# Patient Record
Sex: FEMALE | Race: WHITE | URBAN - METROPOLITAN AREA
[De-identification: names, ages, dates, MRNs, and addresses within clinical notes are randomized per-mention and may not be internally consistent; named-entity substitution may affect disease eponyms.]

---

## 2020-03-20 NOTE — PATIENT DISCUSSION
TODAY'S EXAM WAS CANCELLED  DUE TO COVID-19 PANDEMIC. IT WAS EXPLAINED TO THE PATIENT THAT WE JUST RECEIVED A PHONE CALL THAT A PATIENT WHOM RECENTLY VISITED THE OFFICE WAS EXPOSED TO COVID-19. REASSURED PT THAT THE PATIENT EXPOSED REPORTS NO SYMPTOMS AND IS HEALTHY. ADVISED CANCELLING TODAY'S VISIT IS STRICTLY PRECAUTIONARY FOR THE SAFETY OF OUR PATIENTS AND STAFF. PT VERBALIZED UNDERSTANDING AND WILL RESCHEDULE AT A LATER DATE.

## 2020-06-23 NOTE — PATIENT DISCUSSION
AFTER REVIEW OF ICL TESTING THE PATIENT DOES NOT QUALIFY FOR ICL BASED ON ANTERIOR CHAMBER DEPTH AND CORNEAL THICKNESS.

## 2022-05-18 ENCOUNTER — APPOINTMENT (RX ONLY)
Dept: URBAN - METROPOLITAN AREA CLINIC 135 | Facility: CLINIC | Age: 52
Setting detail: DERMATOLOGY
End: 2022-05-18

## 2022-05-18 DIAGNOSIS — D49.2 NEOPLASM OF UNSPECIFIED BEHAVIOR OF BONE, SOFT TISSUE, AND SKIN: ICD-10-CM

## 2022-05-18 DIAGNOSIS — L82.1 OTHER SEBORRHEIC KERATOSIS: ICD-10-CM

## 2022-05-18 PROCEDURE — ? COUNSELING

## 2022-05-18 PROCEDURE — ? DEFER

## 2022-05-18 PROCEDURE — ? REFERRAL

## 2022-05-18 PROCEDURE — 99213 OFFICE O/P EST LOW 20 MIN: CPT

## 2022-05-18 ASSESSMENT — LOCATION SIMPLE DESCRIPTION DERM
LOCATION SIMPLE: RIGHT EYE
LOCATION SIMPLE: LEFT ANTERIOR NECK

## 2022-05-18 ASSESSMENT — LOCATION ZONE DERM
LOCATION ZONE: NECK
LOCATION ZONE: CORNEA

## 2022-05-18 ASSESSMENT — LOCATION DETAILED DESCRIPTION DERM
LOCATION DETAILED: LEFT CLAVICULAR NECK
LOCATION DETAILED: RIGHT IRIS

## 2022-08-23 ENCOUNTER — NEW PATIENT (OUTPATIENT)
Dept: URBAN - METROPOLITAN AREA CLINIC 44 | Facility: CLINIC | Age: 52
End: 2022-08-23

## 2022-08-23 DIAGNOSIS — H00.12: ICD-10-CM

## 2022-08-23 PROCEDURE — 99203 OFFICE O/P NEW LOW 30 MIN: CPT

## 2022-08-23 PROCEDURE — 92285 EXTERNAL OCULAR PHOTOGRAPHY: CPT

## 2022-08-23 ASSESSMENT — VISUAL ACUITY
OD_SC: 20/15
OS_SC: 20/15